# Patient Record
Sex: MALE | Race: WHITE | NOT HISPANIC OR LATINO | Employment: FULL TIME | URBAN - METROPOLITAN AREA
[De-identification: names, ages, dates, MRNs, and addresses within clinical notes are randomized per-mention and may not be internally consistent; named-entity substitution may affect disease eponyms.]

---

## 2017-01-06 ENCOUNTER — AMBULATORY - HEALTHEAST (OUTPATIENT)
Dept: CARDIOLOGY | Facility: CLINIC | Age: 57
End: 2017-01-06

## 2017-01-11 ENCOUNTER — OFFICE VISIT - HEALTHEAST (OUTPATIENT)
Dept: PULMONOLOGY | Facility: OTHER | Age: 57
End: 2017-01-11

## 2017-01-11 DIAGNOSIS — E66.2 OBESITY HYPOVENTILATION SYNDROME (H): ICD-10-CM

## 2017-01-11 DIAGNOSIS — G47.33 OSA (OBSTRUCTIVE SLEEP APNEA): ICD-10-CM

## 2017-01-11 DIAGNOSIS — R06.09 DYSPNEA ON EXERTION: ICD-10-CM

## 2017-01-13 ENCOUNTER — RECORDS - HEALTHEAST (OUTPATIENT)
Dept: ADMINISTRATIVE | Facility: OTHER | Age: 57
End: 2017-01-13

## 2017-01-26 ENCOUNTER — AMBULATORY - HEALTHEAST (OUTPATIENT)
Dept: PULMONOLOGY | Facility: OTHER | Age: 57
End: 2017-01-26

## 2017-02-01 ENCOUNTER — AMBULATORY - HEALTHEAST (OUTPATIENT)
Dept: PULMONOLOGY | Facility: OTHER | Age: 57
End: 2017-02-01

## 2017-03-13 ENCOUNTER — RECORDS - HEALTHEAST (OUTPATIENT)
Dept: ADMINISTRATIVE | Facility: OTHER | Age: 57
End: 2017-03-13

## 2017-03-26 ENCOUNTER — RECORDS - HEALTHEAST (OUTPATIENT)
Dept: ADMINISTRATIVE | Facility: OTHER | Age: 57
End: 2017-03-26

## 2017-04-12 ENCOUNTER — RECORDS - HEALTHEAST (OUTPATIENT)
Dept: ADMINISTRATIVE | Facility: OTHER | Age: 57
End: 2017-04-12

## 2017-04-16 ENCOUNTER — RECORDS - HEALTHEAST (OUTPATIENT)
Dept: ADMINISTRATIVE | Facility: OTHER | Age: 57
End: 2017-04-16

## 2017-04-17 ENCOUNTER — OFFICE VISIT - HEALTHEAST (OUTPATIENT)
Dept: PULMONOLOGY | Facility: OTHER | Age: 57
End: 2017-04-17

## 2017-04-17 DIAGNOSIS — G47.33 OSA (OBSTRUCTIVE SLEEP APNEA): ICD-10-CM

## 2017-04-17 DIAGNOSIS — E66.2 OBESITY HYPOVENTILATION SYNDROME (H): ICD-10-CM

## 2017-04-17 DIAGNOSIS — R06.09 DOE (DYSPNEA ON EXERTION): ICD-10-CM

## 2017-08-02 ENCOUNTER — RECORDS - HEALTHEAST (OUTPATIENT)
Dept: ADMINISTRATIVE | Facility: OTHER | Age: 57
End: 2017-08-02

## 2017-08-10 ENCOUNTER — OFFICE VISIT - HEALTHEAST (OUTPATIENT)
Dept: PULMONOLOGY | Facility: OTHER | Age: 57
End: 2017-08-10

## 2017-08-10 DIAGNOSIS — R06.09 DOE (DYSPNEA ON EXERTION): ICD-10-CM

## 2017-08-10 DIAGNOSIS — G47.33 OSA (OBSTRUCTIVE SLEEP APNEA): ICD-10-CM

## 2017-08-10 DIAGNOSIS — I50.32 CHRONIC DIASTOLIC CONGESTIVE HEART FAILURE (H): ICD-10-CM

## 2017-08-10 RX ORDER — POTASSIUM CHLORIDE 1500 MG/1
20 TABLET, EXTENDED RELEASE ORAL
Status: SHIPPED | COMMUNITY
Start: 2017-07-20

## 2018-02-19 ENCOUNTER — RECORDS - HEALTHEAST (OUTPATIENT)
Dept: ADMINISTRATIVE | Facility: OTHER | Age: 58
End: 2018-02-19

## 2018-02-27 ENCOUNTER — OFFICE VISIT - HEALTHEAST (OUTPATIENT)
Dept: PULMONOLOGY | Facility: OTHER | Age: 58
End: 2018-02-27

## 2018-02-27 DIAGNOSIS — R06.09 DYSPNEA ON EXERTION: ICD-10-CM

## 2018-02-27 DIAGNOSIS — G47.33 OBSTRUCTIVE SLEEP APNEA: ICD-10-CM

## 2018-02-27 DIAGNOSIS — E66.2 OBESITY HYPOVENTILATION SYNDROME (H): ICD-10-CM

## 2018-02-27 DIAGNOSIS — J96.11 CHRONIC RESPIRATORY FAILURE WITH HYPOXIA (H): ICD-10-CM

## 2018-02-28 ENCOUNTER — AMBULATORY - HEALTHEAST (OUTPATIENT)
Dept: PULMONOLOGY | Facility: OTHER | Age: 58
End: 2018-02-28

## 2018-03-12 ENCOUNTER — RECORDS - HEALTHEAST (OUTPATIENT)
Dept: ADMINISTRATIVE | Facility: OTHER | Age: 58
End: 2018-03-12

## 2018-03-13 ENCOUNTER — OFFICE VISIT - HEALTHEAST (OUTPATIENT)
Dept: PULMONOLOGY | Facility: OTHER | Age: 58
End: 2018-03-13

## 2018-03-13 DIAGNOSIS — E66.2 OBESITY HYPOVENTILATION SYNDROME (H): ICD-10-CM

## 2018-03-13 DIAGNOSIS — G47.33 OSA (OBSTRUCTIVE SLEEP APNEA): ICD-10-CM

## 2018-03-13 DIAGNOSIS — I27.20 PULMONARY HYPERTENSION (H): ICD-10-CM

## 2018-03-16 ENCOUNTER — AMBULATORY - HEALTHEAST (OUTPATIENT)
Dept: PULMONOLOGY | Facility: OTHER | Age: 58
End: 2018-03-16

## 2018-03-16 DIAGNOSIS — G47.33 OSA (OBSTRUCTIVE SLEEP APNEA): ICD-10-CM

## 2018-03-27 ENCOUNTER — RECORDS - HEALTHEAST (OUTPATIENT)
Dept: ADMINISTRATIVE | Facility: OTHER | Age: 58
End: 2018-03-27

## 2018-04-06 ENCOUNTER — RECORDS - HEALTHEAST (OUTPATIENT)
Dept: ADMINISTRATIVE | Facility: OTHER | Age: 58
End: 2018-04-06

## 2018-06-11 ENCOUNTER — RECORDS - HEALTHEAST (OUTPATIENT)
Dept: ADMINISTRATIVE | Facility: OTHER | Age: 58
End: 2018-06-11

## 2018-06-18 ENCOUNTER — RECORDS - HEALTHEAST (OUTPATIENT)
Dept: ADMINISTRATIVE | Facility: OTHER | Age: 58
End: 2018-06-18

## 2019-02-25 ENCOUNTER — RECORDS - HEALTHEAST (OUTPATIENT)
Dept: ADMINISTRATIVE | Facility: OTHER | Age: 59
End: 2019-02-25

## 2019-05-14 ENCOUNTER — COMMUNICATION - HEALTHEAST (OUTPATIENT)
Dept: PULMONOLOGY | Facility: OTHER | Age: 59
End: 2019-05-14

## 2019-06-10 ENCOUNTER — AMBULATORY - HEALTHEAST (OUTPATIENT)
Dept: PULMONOLOGY | Facility: OTHER | Age: 59
End: 2019-06-10

## 2019-06-10 ENCOUNTER — OFFICE VISIT - HEALTHEAST (OUTPATIENT)
Dept: PULMONOLOGY | Facility: OTHER | Age: 59
End: 2019-06-10

## 2019-06-10 ENCOUNTER — COMMUNICATION - HEALTHEAST (OUTPATIENT)
Dept: INTENSIVE CARE | Facility: CLINIC | Age: 59
End: 2019-06-10

## 2019-06-10 DIAGNOSIS — R06.02 SHORTNESS OF BREATH: ICD-10-CM

## 2019-06-10 DIAGNOSIS — I27.20 PULMONARY HYPERTENSION (H): ICD-10-CM

## 2019-06-10 DIAGNOSIS — G47.33 OSA (OBSTRUCTIVE SLEEP APNEA): ICD-10-CM

## 2019-06-10 DIAGNOSIS — R09.02 HYPOXEMIA: ICD-10-CM

## 2019-06-10 LAB
ALBUMIN SERPL-MCNC: 3.7 G/DL (ref 3.5–5)
ALP SERPL-CCNC: 67 U/L (ref 45–120)
ALT SERPL W P-5'-P-CCNC: 34 U/L (ref 0–45)
ANION GAP SERPL CALCULATED.3IONS-SCNC: 7 MMOL/L (ref 5–18)
AST SERPL W P-5'-P-CCNC: 25 U/L (ref 0–40)
BASOPHILS # BLD AUTO: 0.1 THOU/UL (ref 0–0.2)
BASOPHILS NFR BLD AUTO: 1 % (ref 0–2)
BILIRUB SERPL-MCNC: 1.2 MG/DL (ref 0–1)
BNP SERPL-MCNC: 117 PG/ML (ref 0–48)
BUN SERPL-MCNC: 18 MG/DL (ref 8–22)
CALCIUM SERPL-MCNC: 9.4 MG/DL (ref 8.5–10.5)
CHLORIDE BLD-SCNC: 107 MMOL/L (ref 98–107)
CO2 SERPL-SCNC: 28 MMOL/L (ref 22–31)
CREAT SERPL-MCNC: 0.91 MG/DL (ref 0.7–1.3)
EOSINOPHIL # BLD AUTO: 0.1 THOU/UL (ref 0–0.4)
EOSINOPHIL NFR BLD AUTO: 1 % (ref 0–6)
ERYTHROCYTE [DISTWIDTH] IN BLOOD BY AUTOMATED COUNT: 16.8 % (ref 11–14.5)
GFR SERPL CREATININE-BSD FRML MDRD: >60 ML/MIN/1.73M2
GLUCOSE BLD-MCNC: 57 MG/DL (ref 70–125)
HCT VFR BLD AUTO: 41 % (ref 40–54)
HGB BLD-MCNC: 12.7 G/DL (ref 14–18)
HIV 1+2 AB+HIV1 P24 AG SERPL QL IA: NEGATIVE
HIV HS MUST CALL RESULT (HISTORICAL CONVERSION): NORMAL
LYMPHOCYTES # BLD AUTO: 0.8 THOU/UL (ref 0.8–4.4)
LYMPHOCYTES NFR BLD AUTO: 9 % (ref 20–40)
MCH RBC QN AUTO: 27.4 PG (ref 27–34)
MCHC RBC AUTO-ENTMCNC: 31 G/DL (ref 32–36)
MCV RBC AUTO: 88 FL (ref 80–100)
MONOCYTES # BLD AUTO: 0.8 THOU/UL (ref 0–0.9)
MONOCYTES NFR BLD AUTO: 9 % (ref 2–10)
NEUTROPHILS # BLD AUTO: 7 THOU/UL (ref 2–7.7)
NEUTROPHILS NFR BLD AUTO: 80 % (ref 50–70)
PLATELET # BLD AUTO: 255 THOU/UL (ref 140–440)
PMV BLD AUTO: 11.2 FL (ref 8.5–12.5)
POTASSIUM BLD-SCNC: 4.8 MMOL/L (ref 3.5–5)
PROT SERPL-MCNC: 6.1 G/DL (ref 6–8)
RBC # BLD AUTO: 4.64 MILL/UL (ref 4.4–6.2)
SODIUM SERPL-SCNC: 142 MMOL/L (ref 136–145)
WBC: 8.8 THOU/UL (ref 4–11)

## 2019-06-10 RX ORDER — GLIPIZIDE 10 MG/1
10 TABLET, FILM COATED, EXTENDED RELEASE ORAL
Status: SHIPPED | COMMUNITY
Start: 2018-12-10

## 2019-06-11 LAB — SCL-70 AUTOANTIBODIES - HISTORICAL: 0 EU

## 2019-06-12 ENCOUNTER — HOSPITAL ENCOUNTER (OUTPATIENT)
Dept: CT IMAGING | Facility: HOSPITAL | Age: 59
Discharge: HOME | End: 2019-06-12
Attending: INTERNAL MEDICINE

## 2019-06-12 ENCOUNTER — HOSPITAL ENCOUNTER (OUTPATIENT)
Dept: RADIOLOGY | Facility: HOSPITAL | Age: 59
Discharge: HOME | End: 2019-06-12
Attending: INTERNAL MEDICINE

## 2019-06-12 DIAGNOSIS — R06.02 SHORTNESS OF BREATH: ICD-10-CM

## 2019-06-12 DIAGNOSIS — R09.02 HYPOXEMIA: ICD-10-CM

## 2019-06-12 DIAGNOSIS — I27.20 PULMONARY HYPERTENSION (H): ICD-10-CM

## 2019-06-14 ENCOUNTER — COMMUNICATION - HEALTHEAST (OUTPATIENT)
Dept: PULMONOLOGY | Facility: OTHER | Age: 59
End: 2019-06-14

## 2019-06-14 DIAGNOSIS — R09.02 HYPOXIA: ICD-10-CM

## 2019-06-14 DIAGNOSIS — I27.20 PULMONARY HYPERTENSION (H): ICD-10-CM

## 2019-06-19 ENCOUNTER — AMBULATORY - HEALTHEAST (OUTPATIENT)
Dept: SLEEP MEDICINE | Facility: CLINIC | Age: 59
End: 2019-06-19

## 2019-06-24 ENCOUNTER — HOSPITAL ENCOUNTER (OUTPATIENT)
Dept: NUCLEAR MEDICINE | Facility: HOSPITAL | Age: 59
Discharge: HOME | End: 2019-06-24
Attending: INTERNAL MEDICINE

## 2019-06-24 ENCOUNTER — HOSPITAL ENCOUNTER (OUTPATIENT)
Dept: RADIOLOGY | Facility: HOSPITAL | Age: 59
Discharge: HOME | End: 2019-06-24
Attending: INTERNAL MEDICINE

## 2019-06-24 DIAGNOSIS — I27.20 PULMONARY HYPERTENSION (H): ICD-10-CM

## 2019-06-24 DIAGNOSIS — R09.02 HYPOXIA: ICD-10-CM

## 2019-06-24 DIAGNOSIS — Z92.89 HISTORY OF V/Q SCAN: ICD-10-CM

## 2019-07-02 ENCOUNTER — RECORDS - HEALTHEAST (OUTPATIENT)
Dept: PULMONOLOGY | Facility: OTHER | Age: 59
End: 2019-07-02

## 2019-07-02 ENCOUNTER — RECORDS - HEALTHEAST (OUTPATIENT)
Dept: ADMINISTRATIVE | Facility: OTHER | Age: 59
End: 2019-07-02

## 2019-07-02 DIAGNOSIS — R06.02 SHORTNESS OF BREATH: ICD-10-CM

## 2019-07-02 DIAGNOSIS — R09.02 HYPOXEMIA: ICD-10-CM

## 2019-07-02 DIAGNOSIS — I27.20 PULMONARY HYPERTENSION, UNSPECIFIED (H): ICD-10-CM

## 2019-07-03 ENCOUNTER — OFFICE VISIT - HEALTHEAST (OUTPATIENT)
Dept: PULMONOLOGY | Facility: OTHER | Age: 59
End: 2019-07-03

## 2019-07-03 DIAGNOSIS — R06.09 DOE (DYSPNEA ON EXERTION): ICD-10-CM

## 2019-07-03 DIAGNOSIS — R09.02 HYPOXEMIA: ICD-10-CM

## 2019-07-03 DIAGNOSIS — G47.33 OSA (OBSTRUCTIVE SLEEP APNEA): ICD-10-CM

## 2019-07-03 DIAGNOSIS — E66.2 OBESITY HYPOVENTILATION SYNDROME (H): ICD-10-CM

## 2019-08-14 ENCOUNTER — AMBULATORY - HEALTHEAST (OUTPATIENT)
Dept: PULMONOLOGY | Facility: OTHER | Age: 59
End: 2019-08-14

## 2019-08-14 ENCOUNTER — OFFICE VISIT - HEALTHEAST (OUTPATIENT)
Dept: PULMONOLOGY | Facility: OTHER | Age: 59
End: 2019-08-14

## 2019-08-14 DIAGNOSIS — J96.11 CHRONIC RESPIRATORY FAILURE WITH HYPOXIA (H): ICD-10-CM

## 2019-08-14 DIAGNOSIS — G47.33 OSA (OBSTRUCTIVE SLEEP APNEA): ICD-10-CM

## 2019-08-14 DIAGNOSIS — I27.20 PULMONARY HYPERTENSION (H): ICD-10-CM

## 2019-09-09 ENCOUNTER — RECORDS - HEALTHEAST (OUTPATIENT)
Dept: ADMINISTRATIVE | Facility: OTHER | Age: 59
End: 2019-09-09

## 2019-09-09 ENCOUNTER — AMBULATORY - HEALTHEAST (OUTPATIENT)
Dept: PULMONOLOGY | Facility: OTHER | Age: 59
End: 2019-09-09

## 2019-09-26 ENCOUNTER — RECORDS - HEALTHEAST (OUTPATIENT)
Dept: ADMINISTRATIVE | Facility: OTHER | Age: 59
End: 2019-09-26

## 2020-01-10 ENCOUNTER — RECORDS - HEALTHEAST (OUTPATIENT)
Dept: ADMINISTRATIVE | Facility: OTHER | Age: 60
End: 2020-01-10

## 2020-03-09 ENCOUNTER — COMMUNICATION - HEALTHEAST (OUTPATIENT)
Dept: SCHEDULING | Facility: CLINIC | Age: 60
End: 2020-03-09

## 2020-05-18 ENCOUNTER — AMBULATORY - HEALTHEAST (OUTPATIENT)
Dept: SURGERY | Facility: CLINIC | Age: 60
End: 2020-05-18

## 2020-05-18 DIAGNOSIS — Z11.59 ENCOUNTER FOR SCREENING FOR OTHER VIRAL DISEASES: ICD-10-CM

## 2020-06-15 ENCOUNTER — AMBULATORY - HEALTHEAST (OUTPATIENT)
Dept: FAMILY MEDICINE | Facility: CLINIC | Age: 60
End: 2020-06-15

## 2020-06-15 DIAGNOSIS — Z11.59 ENCOUNTER FOR SCREENING FOR OTHER VIRAL DISEASES: ICD-10-CM

## 2020-06-18 ENCOUNTER — ANESTHESIA - HEALTHEAST (OUTPATIENT)
Dept: SURGERY | Facility: CLINIC | Age: 60
End: 2020-06-18

## 2020-06-18 ENCOUNTER — SURGERY - HEALTHEAST (OUTPATIENT)
Dept: SURGERY | Facility: CLINIC | Age: 60
End: 2020-06-18

## 2020-06-18 ASSESSMENT — MIFFLIN-ST. JEOR: SCORE: 2033.71

## 2021-05-28 NOTE — TELEPHONE ENCOUNTER
"Call from patient. Has seen Dr. Ayala in the past (over 1 year ago).  States he has been having more shortness of breath with lower oxygen saturations recently.  Was just at his cardiologist and they told him his breathing issues are not caused by his heart.    Patient states his oxygen levels are as low as 78-80% when he is up walking around. Patient has oxygen at home but states he needs to send it back because he cannot afford it, insurance will not pay.  When he sits and rests his oxygen sats are up to 90%.  sats recover quickly with rest.    Suggested that patient needs to use his oxygen as much as possible to keep his sats greater than 88%.  States all he has is tanks and he cannot \"cart them around\".     Explained to patient that he has not been seen for over a year so an office visit will be needed to document need for oxygen.  As an alternative, he could see his PCP for assistance until he can be seen by pulm.  Was able to scheduled for [address] and will place patient on the cancellation list for earlier appointment time. If needing assistance immediately, he can be seen in urgent care or Ed.  "

## 2021-05-29 NOTE — PROGRESS NOTES
DME Provider: Danial  Date Faxed: 6/10/19  Ordering Provider: Dr. Vieira  Equipment ordered: oxygen

## 2021-05-29 NOTE — PROGRESS NOTES
Oxygen saturation walk test    Patient oxygen saturation on RA at rest is 80%.    Oxygen pulse dose testing  While ambulating 150ft on 4LPM at pulse dose, oxygen saturation is 81%.     Oxygen continuous dose testing  While ambulating 150ft on 2LPM continuous dose, oxygen saturation is 82%.  While ambulating 150ft on 3LPM continuous dose, oxygen saturation is 87%.  While ambulating 150ft on 4LPM continuous dose, oxygen saturation is 89%.    Pt states that he doesn't want tanks, he wants a POC.      DME Provider: Danial  (he currently has a concentrator that he uses 2L at night with BiPap)      Patient is ambulatory within his/her home.

## 2021-05-29 NOTE — PROGRESS NOTES
Sleep Direct Referral Source: Pulmonary  Order for sleep study received, reviewed.   History (per ordering provider's note and chart review):   Pulmonary hypertension thought to be WHO 3 due to OHS and STUART, last seen by Dr. Ayala in our clinic in March 2018.  At that time his symptoms were stable and his O2 saturations were normal on room air.  He has been using 2 L/min oxygen bleed and at night with his home BiPAP machine which he states he uses every night since obtaining it 3 years ago.  He presents to clinic today for worsening shortness of breath and hypoxemia noted to be in the low 80s at home for the past 4 to 6 weeks.  He denies fevers or chills, cough, recent sick contacts, or other history concerning for acute infection.  He denies any recent weight gain and she is actually lost about 20 pounds since obtaining his BiPAP machine 3 years ago.  He has followed by a cardiologist at [facility] who have determined his cardiac issues are not the cause of his shortness of breath or hypoxemia, however they are concerned for worsening of his pulmonary hypertension.  Patient continues to smoke about 2 cigars/week and is exposed to feel exhausted in his job as a .    Comments for study:   Please schedule consult first  Jose Estrella MD  6:42 PM, 6/19/2019

## 2021-05-29 NOTE — TELEPHONE ENCOUNTER
"Called by lab over 8 hours after low glucose drawn from pulmonary clinic. Glucose of 52. Called patient-he is awake and alert and feeling ok. He was confused why I was concerned about hypoglycemia, \"isn't the goal to have low glucose?\"     I explained that too low can be dangerous too. He has glucometer and will check his sugars now. Goal is >80. I asked that he call his PCP prior to taking glipizide and discuss what needs to be adjusted.    Dr. Vieira updated.    Philly Moore MD  Electronically signed on 6/10/2019 10:31 PM      "

## 2021-05-29 NOTE — PATIENT INSTRUCTIONS - HE
New sleep study and sleep clinic follow-up    CXR today    Chest CT angiogram to look for blood clots    Pulmonary Function Tests    Blood tests today     Use home oxygen during daytime to maintain sats>92%    Use mask when around gas fumes    Avoid/refrain from all types of smoke (including cigars)    Return to pulmonary clinic in 2 weeks

## 2021-05-29 NOTE — PROGRESS NOTES
Assessment:Kenrick Garner is a 59 y.o. with a past medical history significant for pulmonary hypertension thought to be WHO 3 due to OHS and STUART, last seen by Dr. Ayala in our clinic in March 2018.  At that time his symptoms were stable and his O2 saturations were normal on room air.  He has been using 2 L/min oxygen bleed and at night with his home BiPAP machine which he states he uses every night since obtaining it 3 years ago.  He presents to clinic today for worsening shortness of breath and hypoxemia noted to be in the low 80s at home for the past 4 to 6 weeks.  He denies fevers or chills, cough, recent sick contacts, or other history concerning for acute infection.  He denies any recent weight gain and she is actually lost about 20 pounds since obtaining his BiPAP machine 3 years ago.  He has followed by a cardiologist at [facility] who have determined his cardiac issues are not the cause of his shortness of breath or hypoxemia, however they are concerned for worsening of his pulmonary hypertension.  Patient continues to smoke about 2 cigars/week and is exposed to feel exhausted in his job as a .    At clinic visit today, the patient was noted to be hypoxic in the low 80s on room air.  He required 1 L/min of oxygen at rest, and 4 L/min with ambulation to maintain O2 sats in the normal range.    4/5/2019 echocardiogram at [facility]: Normal LVEF, no change noted from 7/2017 echocardiogram, right ventricular systolic function cannot be adequately assessed    Worsening in his shortness of breath and new hypoxemia during the daytime could be due to worsening of his pulmonary hypertension or another etiology such as PE, interstitial lung disease, or other pathology.    Plan:  New sleep study and sleep clinic follow-up, the patient states that it has been 3 years since his BiPAP has been checked    CXR ordered for and patient instructed to obtain it today    If chest CT negative, obtain chest CT  angiogram to look for pulmonary emboli and/or fibrosis    Pulmonary Function Tests    Blood tests today to check renal function and for additional causes of pulmonary hypertension    Use home oxygen during daytime to maintain sats>92%: 1 L/min at rest and 4 L/min with exertion.  I emphasized to the patient and his wife to use home oxygen at all times, particularly when driving as hypoxemia could put him at risk of becoming unconscious placing him at risk to himself and others.  Both he and his wife verbalized their understanding.  Orders for portable oxygen to use at rest and with exertion during the daytime have been sent to his DME provider at [facility].    Patient advised to use mask when around gas fumes    Avoid/refrain from all types of smoke (including cigars)    Return to pulmonary clinic in 2 weeks, will attempt to schedule with 1 of our pulmonary hypertension focused providers    CCx: Shortness of breath    HPI: The patient states that he has been having severe shortness of breath for little over a month.  Shortness of breath primarily occurs with exertion, but occasionally at rest.  He has not noted any modifying factors.  He has been seen by cardiologist with a negative work-up.  He denies any recent cough, fevers or chills, nausea vomiting, or other infectious symptoms.  He continues to use his BiPAP every night with 2 L of oxygen bleed done, however does not use oxygen during the daytime.  He has noted his home O2 sats to be in the low 80s on multiple occasions at home.  He continues to smoke a couple of cigars per week.  He states that he is also exposed to frequent gas fumes at his job as a  which he cannot avoid, however he believes he could get a mask to use around the fumes.    ROS:  A review of 12 organ systems was performed with pertinent positives and negatives noted in the HPI.      Current Meds:  Current Outpatient Medications   Medication Sig Note     acetaminophen (TYLENOL) 500  MG tablet Take 1,000 mg by mouth every 6 (six) hours as needed for pain.      amLODIPine (NORVASC) 10 MG tablet Take 10 mg by mouth daily.      aspirin 81 MG EC tablet Take 81 mg by mouth daily.      atorvastatin (LIPITOR) 40 MG tablet Take 40 mg by mouth every evening.       carvedilol (COREG) 12.5 MG tablet Take 12.5 mg by mouth 2 (two) times a day with meals.      furosemide (LASIX) 40 MG tablet Take 40 mg by mouth. 8/10/2017: Received from: [employer] Received Sig: Take 1 tablet by mouth every morning.     glipiZIDE (GLUCOTROL XL) 10 MG 24 hr tablet Take 10 mg by mouth 2 (two) times a day.      lisinopril (PRINIVIL,ZESTRIL) 40 MG tablet Take 40 mg by mouth daily.      metFORMIN (GLUCOPHAGE) 500 MG tablet Take 1,000 mg by mouth 2 (two) times a day with meals.      multivitamin with minerals (THERA-M) 9 mg iron-400 mcg Tab tablet Take 1 tablet by mouth daily.      nitroglycerin (NITROSTAT) 0.4 MG SL tablet Place 0.4 mg under the tongue every 5 (five) minutes as needed for chest pain.      omeprazole (PRILOSEC) 20 MG capsule Take 20 mg by mouth daily.      OXYGEN-AIR DELIVERY SYSTEMS [facility] Use As Directed. 2L with BiPap at night  [facility]      potassium chloride SA (K-DUR,KLOR-CON) 20 MEQ tablet Take 20 mEq by mouth. 8/10/2017: Received from: [employer] Received Sig: Take 1 tablet by mouth once daily with a meal.       Labs:  No results found for this or any previous visit (from the past 72 hour(s)).    I have personally reviewed all pertinent imaging studies and PFT results unless otherwise noted.    Imaging studies:  No results found.      Physical Exam:  /66   Pulse 64   Resp 20   Wt (!) 250 lb 14.4 oz (113.8 kg)   SpO2 94% Comment: 3 LPM  BMI 36.26 kg/m    General - Well nourished  Ears/Mouth -  OP pink moist, no thrush  Neck - no cervical lymphadenopathy  Lungs - Clear to ausculation bilaterally   CVS - regular rhythm with no murmurs,  rubs or gallups  Abdomen - soft, NT, ND, NABS  Ext - no cyanosis, clubbing or edema  Skin - no rash  Psychology - alert and oriented, answers appropriate        Electronically signed by:    Puma Vieira MD  [facility] Pulmonary and Critical Care Medicine

## 2021-05-29 NOTE — TELEPHONE ENCOUNTER
I returned Mr Garner's call regarding the results of his chest CTA earlier this week, informing him that no acute blood clot was found. There was evidence of pulmonary edema/interstitial fluid, which could be from pulmonary HTN or HF. His BNP was also elevated, and his HIV and Scl were negative. His recent TTE at [place] in April did not show overt evidence of HF, however. I will defer to Dr Jacobs whom he sees in a few weeks whther to pursue a right heart catheterization. In the meantime, I will order a V/Q scan to eval for more chronic thromboembolic disease. The patient was in agreement with this plan.    I also advised him again to use home oxygen with exertion and especially while driving for now, and he verbalized his understanding.

## 2021-05-30 VITALS — BODY MASS INDEX: 37.57 KG/M2 | WEIGHT: 260 LBS

## 2021-05-30 VITALS — WEIGHT: 268.2 LBS | BODY MASS INDEX: 38.76 KG/M2

## 2021-05-30 NOTE — PROGRESS NOTES
Assessment:Kenrick Garner is a 59 y.o. with a past medical history significant for pulmonary hypertension thought to be WHO 3 due to OHS and STUART, last seen by Dr. Ayala in our clinic in March 2018.  At the last clinic visit and he was noted to be desaturating at rest on room air and was advised to wear supplemental oxygen throughout the day.  He also underwent an evaluation to look for other etiologies for his hypoxemia and is here to follow-up with this.  Notably his chest CTA demonstrated very minimal fibro-basilar atelectasis with small airways disease and his VQ scan was unremarkable.  I suspect that his daytime hypoxia is also related to his obesity hypoventilation syndrome. For the present we would recommend;    Going compliance bilevel ventilation as he is presently.    Continue to weight loss.    Explained to the patient that he should wear his supplemental oxygen 24 hours a day.    Follow-up in 3 months.    Erma Pottsvi  Pulmonary and Critical Care  [id number]      CCx: Shortness of breath    HPI: The patient states that he has been having severe shortness of breath for little over a month.  Shortness of breath primarily occurs with exertion, but occasionally at rest.  He has not noted any modifying factors.  He has been seen by cardiologist with a negative work-up.  He denies any recent cough, fevers or chills, nausea vomiting, or other infectious symptoms.  He continues to use his BiPAP every night with 2 L of oxygen bleed done, however does not use oxygen during the daytime.  He has noted his home O2 sats to be in the low 80s on multiple occasions at home.  He continues to smoke a couple of cigars per week.  He states that he is also exposed to frequent gas fumes at his job as a  which he cannot avoid, however he believes he could get a mask to use around the fumes.    ROS:  A review of 12 organ systems was performed with pertinent positives and negatives noted in the HPI.      Current  Meds:  Current Outpatient Medications   Medication Sig Note     acetaminophen (TYLENOL) 500 MG tablet Take 1,000 mg by mouth every 6 (six) hours as needed for pain.      amLODIPine (NORVASC) 10 MG tablet Take 10 mg by mouth daily.      aspirin 81 MG EC tablet Take 81 mg by mouth daily.      atorvastatin (LIPITOR) 40 MG tablet Take 40 mg by mouth every evening.       carvedilol (COREG) 12.5 MG tablet Take 12.5 mg by mouth 2 (two) times a day with meals.      furosemide (LASIX) 40 MG tablet Take 40 mg by mouth. 8/10/2017: Received from: [employer] [facility] [employer] [facility] Received Sig: Take 1 tablet by mouth every morning.     glipiZIDE (GLUCOTROL XL) 10 MG 24 hr tablet Take 10 mg by mouth 2 (two) times a day.      lisinopril (PRINIVIL,ZESTRIL) 40 MG tablet Take 40 mg by mouth daily.      metFORMIN (GLUCOPHAGE) 500 MG tablet Take 1,000 mg by mouth 2 (two) times a day with meals.      multivitamin with minerals (THERA-M) 9 mg iron-400 mcg Tab tablet Take 1 tablet by mouth daily.      nitroglycerin (NITROSTAT) 0.4 MG SL tablet Place 0.4 mg under the tongue every 5 (five) minutes as needed for chest pain.      omeprazole (PRILOSEC) 20 MG capsule Take 20 mg by mouth daily.      OXYGEN-AIR DELIVERY SYSTEMS [facility] Use As Directed. 2L with BiPap at night  [facility]      potassium chloride SA (K-DUR,KLOR-CON) 20 MEQ tablet Take 20 mEq by mouth. 8/10/2017: Received from: [employer] Received Sig: Take 1 tablet by mouth once daily with a meal.       Labs:  No results found for this or any previous visit (from the past 72 hour(s)).    I have personally reviewed all pertinent imaging studies and PFT results unless otherwise noted.    Imaging studies:  No results found.      Physical Exam:  /70   Pulse 60   Resp 24   Wt (!) 249 lb 11.2 oz (113.3 kg)   SpO2 (!) 75% Comment: RA resting  BMI 36.09 kg/m    Physical Exam   Constitutional: He is oriented to person, place, and time. He  appears well-developed and well-nourished. No distress.   HENT:   Head: Normocephalic and atraumatic.   Eyes: Pupils are equal, round, and reactive to light.   Neck: Normal range of motion.   Cardiovascular: Normal rate and regular rhythm.   Pulmonary/Chest: Effort normal. No respiratory distress. He has no wheezes.   Abdominal: Soft.   Musculoskeletal: Normal range of motion.   Neurological: He is alert and oriented to person, place, and time.   Skin: Skin is warm. He is not diaphoretic.

## 2021-05-30 NOTE — PATIENT INSTRUCTIONS - HE
Terms related to your Pulmonary Function testin. Spirometry: Measures the mechanical function of your lungs; can you get enough air into your lungs; if yes, can you get an appropriate amount out of your lungs?. If not, how much air remains stuck inside your lungs?  1. FEV1: Forced Expiratory Volume in 1 second (measured in liters). 70% or more is normal.  2. FVC: Forced Vital Capacity (measured in liters). 70% or more is normal.  3. FEV1/FVC: Ratio of air forced out in the first second of forcing out air to the total amount of air forced out. This tells us if air is getting stuck in your lungs or not. 70% or more is normal.  2. Lung volumes:Measures the size of your lungs during quiet breathing.   1. TLC: Total lung capacity (measured in liters). Normal range is between %.  2. RV: Residual volume (measured in liters); is the measure of how much air is left over in your lungs after quiet exhalation. No one exhales down to zero volume. Normal range is between %.  3. Diffusion capacity: Measures your lungs ability to exchange oxygen across the lung tissue.  1. DLCOcor:  The normal range is greater than 65%.

## 2021-05-31 VITALS — WEIGHT: 263 LBS | BODY MASS INDEX: 38.01 KG/M2

## 2021-05-31 NOTE — PROGRESS NOTES
Called and spoke with nurse for Dr. Osuna ( [facility]).  Evie will get message to dr. Osuna that Dr. Ayala feels patient is in need of Right Heart Cath.  Asking for this to be done within the next 7-10 days.      Office visit notes will be faxed when completed to [phone].

## 2021-05-31 NOTE — PROGRESS NOTES
Assessment/Plan:        Diagnoses and all orders for this visit:    STUART (obstructive sleep apnea)    Pulmonary hypertension (H)    Chronic respiratory failure with hypoxia (H)    58M with OHS, STUART, pulmonary hypertension here for follow up.  He is doing very poorly.    His hypoxia is worse.  It is out of proportion to his sleep apnea and any contribution from obesity hypoventilation syndrome.    There is no evident airways disease, his parenchyma is normal on PE protocol CT.  V/Q scan is without chronic PE.    Our earlier concern was that he had pulmonary hypertension WHO III but his level of hypoxia seems out of proportion to that.    I am concerned that his pulmonary hypertension is worse and he may need advanced therapies.    I will check his sleep report -  I think it's probably reasonably treated.    I was clear with him that the most important initial treatment is keeping his oxygen saturation 90% or above.          Subjective:    Patient ID: Kenrick Garner is a 59 y.o. male.  59M with known STUART, OHS and previous RHC 18 months ago with Mild pulmonary hypertension, here, doing poorly.    His resting oxygen saturation is usually in the low 80s or mid 70s.  He doesn't measure his walking sats but he gets deep dull chest pain with walking that resolves with rest.  This is not typical of anginal type pain that he has had before.  No sputum, hemoptysis, respiratory symptoms.    He has had an extensive workup for respiratory issues.  He had a RHC 18 months ago that showed moderate pHTN    He does not have much LE edema - very minimal.  No abdominal swelling.    Trouble sleeping supine due to snoring.    Sleep stuff through [facility].      Review of Systems   Respiratory: Positive for shortness of breath.        Objective:    Physical Exam   Constitutional: He is oriented to person, place, and time. He appears well-developed and well-nourished. No distress.   HENT:   Head: Normocephalic.   Nose: Nose normal.   Eyes:  Pupils are equal, round, and reactive to light. Right eye exhibits no discharge. Left eye exhibits no discharge. No scleral icterus.   Cardiovascular: Normal rate and regular rhythm. Exam reveals no gallop and no friction rub.   No murmur heard.  Pulmonary/Chest: Effort normal and breath sounds normal. No respiratory distress. He has no wheezes. He has no rales.   Musculoskeletal: He exhibits no edema or tenderness.   Neurological: He is alert and oriented to person, place, and time. No cranial nerve deficit.   Skin: Skin is warm and dry. No rash noted. He is not diaphoretic. No erythema. No pallor.   Psychiatric: He has a normal mood and affect. His behavior is normal. Judgment and thought content normal.           Current Outpatient Medications on File Prior to Visit   Medication Sig Dispense Refill     acetaminophen (TYLENOL) 500 MG tablet Take 1,000 mg by mouth every 6 (six) hours as needed for pain.       amLODIPine (NORVASC) 10 MG tablet Take 10 mg by mouth daily.       aspirin 81 MG EC tablet Take 81 mg by mouth daily.       atorvastatin (LIPITOR) 40 MG tablet Take 40 mg by mouth every evening.        carvedilol (COREG) 12.5 MG tablet Take 12.5 mg by mouth 2 (two) times a day with meals.       furosemide (LASIX) 40 MG tablet Take 40 mg by mouth.       glipiZIDE (GLUCOTROL XL) 10 MG 24 hr tablet Take 10 mg by mouth 2 (two) times a day.       lisinopril (PRINIVIL,ZESTRIL) 40 MG tablet Take 40 mg by mouth daily.       metFORMIN (GLUCOPHAGE) 500 MG tablet Take 1,000 mg by mouth 2 (two) times a day with meals.       multivitamin with minerals (THERA-M) 9 mg iron-400 mcg Tab tablet Take 1 tablet by mouth daily.       nitroglycerin (NITROSTAT) 0.4 MG SL tablet Place 0.4 mg under the tongue every 5 (five) minutes as needed for chest pain.       omeprazole (PRILOSEC) 20 MG capsule Take 20 mg by mouth daily.       OXYGEN-AIR DELIVERY SYSTEMS MISC Use As Directed. 2L with BiPap at night  [facility]       potassium chloride  SA (K-DUR,KLOR-CON) 20 MEQ tablet Take 20 mEq by mouth.       No current facility-administered medications on file prior to visit.      /70   Pulse 60   Resp 20   Wt (!) 247 lb (112 kg)   SpO2 92% Comment: 4LPM  BMI 35.70 kg/m      Medical History  Active Ambulatory (Non-Hospital) Problems    Diagnosis     Pulmonary hypertension (H)     Coronary artery disease involving native coronary artery of native heart with angina pectoris (H)     Essential hypertension     Dyslipidemia, goal LDL below 70     STUART (obstructive sleep apnea)     Obesity hypoventilation syndrome (H)     Small airways disease     SANCHES (dyspnea on exertion)     Sepsis, due to unspecified organism (H)     Type 2 diabetes mellitus with hyperglycemia (H)     Hematuria, gross     Hematuria     Past Medical History:   Diagnosis Date     Cardiomyopathy (H)      Carpal tunnel syndrome      CHF (congestive heart failure) (H)      Coronary artery disease      DM2 (diabetes mellitus, type 2) (H)      Dyspnea on exertion 12/15/2016     Family history of myocardial infarction      GERD (gastroesophageal reflux disease)      High cholesterol      HLD (hyperlipidemia)      HTN (hypertension)      MI (myocardial infarction) (H)      Sleep apnea, obstructive          Social History  Reviewed, and he is still abstaining from smoking.  Still driving truck.  Minimal exertion at work.   Allergies  Allergies   Allergen Reactions     Tetanus And Diphtheria Toxoids                               Data Review - imaging, labs, and ekgs listed below were reviewed by me.  Chest XRay and chest CT images and EKG tracings interpreted personally.     Past Labs  No visits with results within 2 Month(s) from this visit.   Latest known visit with results is:   Office Visit on 06/10/2019   Component Date Value     BNP 06/10/2019 117*     HIV Antigen / Antibody 06/10/2019 Negative      Must call result to S * 06/10/2019 Philly Moore MD      Sodium 06/10/2019 142       Potassium 06/10/2019 4.8      Chloride 06/10/2019 107      CO2 06/10/2019 28      Anion Gap, Calculation 06/10/2019 7      Glucose 06/10/2019 57*     BUN 06/10/2019 18      Creatinine 06/10/2019 0.91      GFR MDRD Af Amer 06/10/2019 >60      GFR MDRD Non Af Amer 06/10/2019 >60      Bilirubin, Total 06/10/2019 1.2*     Calcium 06/10/2019 9.4      Protein, Total 06/10/2019 6.1      Albumin 06/10/2019 3.7      Alkaline Phosphatase 06/10/2019 67      AST 06/10/2019 25      ALT 06/10/2019 34      Scl-70 Autoantibodies 06/10/2019 0      WBC 06/10/2019 8.8      RBC 06/10/2019 4.64      Hemoglobin 06/10/2019 12.7*     Hematocrit 06/10/2019 41.0      MCV 06/10/2019 88      MCH 06/10/2019 27.4      MCHC 06/10/2019 31.0*     RDW 06/10/2019 16.8*     Platelets 06/10/2019 255      MPV 06/10/2019 11.2      Neutrophils % 06/10/2019 80*     Lymphocytes % 06/10/2019 9*     Monocytes % 06/10/2019 9      Eosinophils % 06/10/2019 1      Basophils % 06/10/2019 1      Neutrophils Absolute 06/10/2019 7.0      Lymphocytes Absolute 06/10/2019 0.8      Monocytes Absolute 06/10/2019 0.8      Eosinophils Absolute 06/10/2019 0.1      Basophils Absolute 06/10/2019 0.1      On review of [facility] cath reports, cards note, echo reports.  Echo without clear RV dysfunction but poor window.  BNP is going up.    >40 minutes spent, >50% counseling and coordination of care.

## 2021-05-31 NOTE — PATIENT INSTRUCTIONS - HE
We are worried you have pulmonary hypertension. This is a problem where the blood pressure in your lungs is too high.    The test for this is a right heart catheterization.  I am going to ask Dr. Osuna to arrange.    There are many treatments available, but getting this information first is critical.    Keep your oxygen level above 90% as much as possible.    Keep wearing your BiPAP as you are.  You are doing a good job.

## 2021-06-01 VITALS — WEIGHT: 264 LBS | BODY MASS INDEX: 38.15 KG/M2

## 2021-06-01 NOTE — PROGRESS NOTES
Call from Evie at Dr. Osuna's office.  Faxed report of cardiac cath.  Dr. Osuna wanted to let dr. Ayala know that patient would like to follow at [facility] in their Pulmonary Hypertension clinic.  He is being set up to be seen there.  Dr. Osuna has spoken with the patient regarding his results.    Evie phone [phone]

## 2021-06-02 ENCOUNTER — RECORDS - HEALTHEAST (OUTPATIENT)
Dept: ADMINISTRATIVE | Facility: CLINIC | Age: 61
End: 2021-06-02

## 2021-06-03 VITALS — WEIGHT: 249.7 LBS | BODY MASS INDEX: 36.09 KG/M2

## 2021-06-03 VITALS — BODY MASS INDEX: 36.26 KG/M2 | WEIGHT: 250.9 LBS

## 2021-06-03 VITALS — BODY MASS INDEX: 35.7 KG/M2 | WEIGHT: 247 LBS

## 2021-06-04 VITALS — HEIGHT: 70 IN | WEIGHT: 267.3 LBS | BODY MASS INDEX: 38.27 KG/M2

## 2021-06-08 NOTE — PROGRESS NOTES
Called [facility] and asked on the status of the order.  Sabine with [facility] stated that they had contacted the patient and left a message for them to contact [facility], but have not heard back from the patient as of yet (01/26/17).

## 2021-06-08 NOTE — ANESTHESIA PREPROCEDURE EVALUATION
Anesthesia Evaluation      Patient summary reviewed   No history of anesthetic complications     Airway   Mallampati: II  Neck ROM: full   Pulmonary    (+) shortness of breath, sleep apnea,                          Cardiovascular   (+) hypertension, CAD, angina, CHF, ,      Neuro/Psych    (+) neuromuscular disease,      Endo/Other    (+) diabetes mellitus,      GI/Hepatic/Renal    (+) GERD,   chronic renal disease,           Dental                         Anesthesia Plan  Planned anesthetic: MAC    ASA 3     Anesthetic plan and risks discussed with: patient    Post-op plan: routine recovery

## 2021-06-08 NOTE — PROGRESS NOTES
Assessment/Plan:        Diagnoses and all orders for this visit:    STUART (obstructive sleep apnea)    Obesity hypoventilation syndrome    Dyspnea on exertion    Severe STUART - will discuss with Sleep MD.  Final PSG read is pending.  It looked like CPAP 10 with 2 lpm was reasonable with cessation of central apneas.    OHS - related- plan on treating STUART and following up.    Dyspnea - not directly related to STUART or hypoventilation, but weight, deconditioning, fluid retention can all worsen this.    I wrote for him to stay off work until 1/23 so we can start CPAP and give him some time on treatment.        Subjective:    Patient ID: Kenrick Garner is a 56 y.o. male.    HPI Comments: Here in follow up.    He did his sleep study and it was tough but he was able to sleep uninterrupted for 2+ hours at the end.  Diagnosed with severe STUART with central apneas.    He is feeling better from a breathing standpoint.  He is no longer wearing his oxygen.      --- progress  56M here for first visit after hospitalisation.  He has been having shortness of breath for several weeks. He was admitted with acute hypoxic respiratory failure recently. However he didn't have much by way of symptoms at that time.  He was diuresed.  He was sent home on oxygen.  His symptoms during the hospitalization weren't affected much.  His symptoms didn't localize.  + lower extremity edema.  No sputum production or hemoptysis.    He had an episode of pneumonia 2 years ago.  This doesn't feel similar to that at all.    30 years ago had a positive PSG for STUART and he did not tolerate CPAP.    He sometimes has sats during periods of inactivity but when up and moving or talking his sats are already normal.  He has no history of lung disease.          Review of Systems             Objective:    Physical Exam   Constitutional: He appears well-developed and well-nourished. No distress.   Skin: He is not diaphoretic.   Psychiatric: He has a normal mood and affect. His  behavior is normal.           Current Outpatient Prescriptions on File Prior to Visit   Medication Sig Dispense Refill     acetaminophen (TYLENOL) 500 MG tablet Take 1,000 mg by mouth every 6 (six) hours as needed for pain.       amLODIPine (NORVASC) 10 MG tablet Take 10 mg by mouth daily.       aspirin 81 MG EC tablet Take 81 mg by mouth daily.       atorvastatin (LIPITOR) 40 MG tablet Take 40 mg by mouth every evening.        carvedilol (COREG) 12.5 MG tablet Take 12.5 mg by mouth 2 (two) times a day with meals.       furosemide (LASIX) 40 MG tablet Take 1 tablet (40 mg total) by mouth daily. 30 tablet 0     lisinopril (PRINIVIL,ZESTRIL) 40 MG tablet Take 40 mg by mouth daily.       metFORMIN (GLUCOPHAGE) 500 MG tablet Take 1,000 mg by mouth 2 (two) times a day with meals.       multivitamin with minerals (THERA-M) 9 mg iron-400 mcg Tab tablet Take 1 tablet by mouth daily.       nitroglycerin (NITROSTAT) 0.4 MG SL tablet Place 0.4 mg under the tongue every 5 (five) minutes as needed for chest pain.       omeprazole (PRILOSEC) 20 MG capsule Take 20 mg by mouth daily.       sitaGLIPtin (JANUVIA) 100 MG tablet Take 1 tablet (100 mg total) by mouth daily. 30 tablet 1     No current facility-administered medications on file prior to visit.      Visit Vitals     /62     Pulse 64     Resp 16     Wt (!) 268 lb 3.2 oz (121.7 kg)     SpO2 94%  Comment: RA     BMI 38.76 kg/m2       Medical History  Active Ambulatory (Non-Hospital) Problems    Diagnosis     Coronary artery disease involving native coronary artery of native heart with angina pectoris     Essential hypertension     Dyslipidemia, goal LDL below 70     STUART (obstructive sleep apnea)     Obesity hypoventilation syndrome     Small airways disease     SANCHES (dyspnea on exertion)     Sepsis, due to unspecified organism     Type 2 diabetes mellitus with hyperglycemia     Hematuria, gross     Hematuria     Past Medical History   Diagnosis Date     Cardiomyopathy       Carpal tunnel syndrome      CHF (congestive heart failure)      Coronary artery disease      DM2 (diabetes mellitus, type 2)      Dyspnea on exertion 12/15/2016     Family history of myocardial infarction      GERD (gastroesophageal reflux disease)      High cholesterol      HLD (hyperlipidemia)      HTN (hypertension)      MI (myocardial infarction)      Sleep apnea, obstructive          Social History  Reviewed, and he hasn't started smoking again.  Still off duty for .     Allergies  Allergies   Allergen Reactions     Tetanus And Diphtheria Toxoids                               Data Review - imaging, labs, and ekgs listed below were reviewed by me.  Chest XRay and chest CT images and EKG tracings interpreted personally.     Past Labs  Admission on 12/15/2016, Discharged on 12/18/2016   No results displayed because visit has over 200 results.          Past Imaging  PSG raw data reviewed by me.  AHI >120 with obstructive, hypopnea, central apnea and mixed.  10/10/2 lpm with normalization of AHI at end of night based on my read. Tolerated it less well earlier in night.

## 2021-06-09 NOTE — ANESTHESIA POSTPROCEDURE EVALUATION
Patient: Kenrick Garner  Procedure(s):  COLONOSCOPY WITH BIOPSIES AND POLYPECTOMIES  Anesthesia type: No value filed.    Patient location: Phase II Recovery  Last vitals:   Vitals Value Taken Time   /60 6/18/2020 11:00 AM   Temp 36.5  C (97.7  F) 6/18/2020 10:24 AM   Pulse 53 6/18/2020 11:07 AM   Resp 14 6/18/2020 10:27 AM   SpO2 98 % 6/18/2020 11:07 AM   Vitals shown include unvalidated device data.  Post vital signs: stable  Level of consciousness: awake and responds to simple questions  Post-anesthesia pain: pain controlled  Post-anesthesia nausea and vomiting: no  Pulmonary: unassisted, return to baseline  Cardiovascular: stable and blood pressure at baseline  Hydration: adequate  Anesthetic events: no    QCDR Measures:  ASA# 11 - Alessia-op Cardiac Arrest: ASA11B - Patient did NOT experience unanticipated cardiac arrest  ASA# 12 - Alessia-op Mortality Rate: ASA12B - Patient did NOT die  ASA# 13 - PACU Re-Intubation Rate: ASA13B - Patient did NOT require a new airway mgmt  ASA# 10 - Composite Anes Safety: ASA10A - No serious adverse event    Additional Notes:

## 2021-06-09 NOTE — ANESTHESIA CARE TRANSFER NOTE
Last vitals:   Vitals:    06/18/20 1024   BP: 121/61   Pulse: 61   Resp: 16   Temp:    SpO2: 97%     Patient's level of consciousness is awake  Spontaneous respirations: yes  Maintains airway independently: yes  Dentition unchanged: yes  Oropharynx: oropharynx clear of all foreign objects    QCDR Measures:  ASA# 20 - Surgical No data recorded  PQRS# 430 - Adult PONV Prevention: NA - Not adult patient, not GA or 3 or more risk factors NOT present  ASA# 8 - Peds PONV Prevention: NA - Not pediatric patient, not GA or 2 or more risk factors NOT present  PQRS# 424 - Alessia-op Temp Management: NA - MAC anesthesia or case < 60 minutes  PQRS# 426 - PACU Transfer Protocol:NA - Patient did not go to PACU  ASA# 14 - Acute Post-op Pain: ASA14B - Patient did NOT experience pain >= 7 out of 10

## 2021-06-10 NOTE — PROGRESS NOTES
Assessment/Plan:        Diagnoses and all orders for this visit:    STUART (obstructive sleep apnea)  -     Oxygen    SANCHES (dyspnea on exertion)  -     Oxygen    Obesity hypoventilation syndrome    57M with OHS, STUART, dyspnea here for follow up.  OHS should improve with STUART treatment    Your sleep is much healthier with your sleep machine.    Wear your BiPAP every night.    Keep up with exercise 5-10 minutes every night after work.    Limit eating to stay awake.        Subjective:    Patient ID: Kenrick Garner is a 57 y.o. male.    HPI Comments: Wearing mask essentially every night.  Only times he hasn't is once or twice when sick.  Goes to bed at  6PM and often wears at least 6 hours.  He cannot feel any change.  His wife says that his sleep is much higher quality.    Dyspnea: slowly improving over months.  Worse with exertion.  Better with rest.  Localizes to the chest.      -- from previous  Here in follow up.    He did his sleep study and it was tough but he was able to sleep uninterrupted for 2+ hours at the end.  Diagnosed with severe STUART with central apneas.    He is feeling better from a breathing standpoint.  He is no longer wearing his oxygen.      --- progress  56M here for first visit after hospitalisation.  He has been having shortness of breath for several weeks. He was admitted with acute hypoxic respiratory failure recently. However he didn't have much by way of symptoms at that time.  He was diuresed.  He was sent home on oxygen.  His symptoms during the hospitalization weren't affected much.  His symptoms didn't localize.  + lower extremity edema.  No sputum production or hemoptysis.    He had an episode of pneumonia 2 years ago.  This doesn't feel similar to that at all.    30 years ago had a positive PSG for STUART and he did not tolerate CPAP.    He sometimes has sats during periods of inactivity but when up and moving or talking his sats are already normal.  He has no history of lung  disease.          Review of Systems  14 system review of system negative except as noted in HPI.        Objective:    Physical Exam   Constitutional: He is oriented to person, place, and time. He appears well-developed and well-nourished. No distress.   HENT:   Head: Normocephalic.   Nose: Nose normal.   Mouth/Throat: Oropharynx is clear and moist. No oropharyngeal exudate.   Eyes: Pupils are equal, round, and reactive to light. Right eye exhibits no discharge. Left eye exhibits no discharge. No scleral icterus.   Neck: Normal range of motion. No JVD present. No tracheal deviation present. No thyromegaly present.   Cardiovascular: Normal rate and regular rhythm.  Exam reveals no gallop and no friction rub.    No murmur heard.  Pulmonary/Chest: Effort normal and breath sounds normal. No stridor. No respiratory distress. He has no wheezes. He has no rales.   Abdominal: Soft. Bowel sounds are normal. He exhibits no distension. There is no tenderness.   Musculoskeletal: He exhibits no edema or tenderness.   Lymphadenopathy:     He has no cervical adenopathy.   Neurological: He is alert and oriented to person, place, and time. No cranial nerve deficit.   Skin: Skin is warm and dry. No rash noted. He is not diaphoretic. No erythema. No pallor.   Psychiatric: He has a normal mood and affect. His behavior is normal. Judgment and thought content normal.           Current Outpatient Prescriptions on File Prior to Visit   Medication Sig Dispense Refill     acetaminophen (TYLENOL) 500 MG tablet Take 1,000 mg by mouth every 6 (six) hours as needed for pain.       amLODIPine (NORVASC) 10 MG tablet Take 10 mg by mouth daily.       aspirin 81 MG EC tablet Take 81 mg by mouth daily.       atorvastatin (LIPITOR) 40 MG tablet Take 40 mg by mouth every evening.        carvedilol (COREG) 12.5 MG tablet Take 12.5 mg by mouth 2 (two) times a day with meals.       lisinopril (PRINIVIL,ZESTRIL) 40 MG tablet Take 40 mg by mouth daily.        metFORMIN (GLUCOPHAGE) 500 MG tablet Take 1,000 mg by mouth 2 (two) times a day with meals.       multivitamin with minerals (THERA-M) 9 mg iron-400 mcg Tab tablet Take 1 tablet by mouth daily.       nitroglycerin (NITROSTAT) 0.4 MG SL tablet Place 0.4 mg under the tongue every 5 (five) minutes as needed for chest pain.       omeprazole (PRILOSEC) 20 MG capsule Take 20 mg by mouth daily.       sitaGLIPtin (JANUVIA) 100 MG tablet Take 1 tablet (100 mg total) by mouth daily. 30 tablet 1     No current facility-administered medications on file prior to visit.      /64  Pulse 64  Resp 18  Wt (!) 260 lb (117.9 kg)  SpO2 96% Comment: RA  BMI 37.57 kg/m2    Medical History  Active Ambulatory (Non-Hospital) Problems    Diagnosis     Coronary artery disease involving native coronary artery of native heart with angina pectoris     Essential hypertension     Dyslipidemia, goal LDL below 70     STUART (obstructive sleep apnea)     Obesity hypoventilation syndrome     Small airways disease     SANCHES (dyspnea on exertion)     Sepsis, due to unspecified organism     Type 2 diabetes mellitus with hyperglycemia     Hematuria, gross     Hematuria     Past Medical History:   Diagnosis Date     Cardiomyopathy      Carpal tunnel syndrome      CHF (congestive heart failure)      Coronary artery disease      DM2 (diabetes mellitus, type 2)      Dyspnea on exertion 12/15/2016     Family history of myocardial infarction      GERD (gastroesophageal reflux disease)      High cholesterol      HLD (hyperlipidemia)      HTN (hypertension)      MI (myocardial infarction)      Sleep apnea, obstructive          Social History  Reviewed, and he hasn't started smoking again.  He has started driving again.     Allergies  Allergies   Allergen Reactions     Tetanus And Diphtheria Toxoids                               Data Review - imaging, labs, and ekgs listed below were reviewed by me.  Chest XRay and chest CT images and EKG tracings interpreted  personally.     Past Labs  No visits with results within 2 Month(s) from this visit.  Latest known visit with results is:    Admission on 01/24/2017, Discharged on 01/24/2017   Component Date Value     Color, UA 01/24/2017 Red*     Clarity, UA 01/24/2017 Turbid*     Glucose, UA 01/24/2017 Negative      Bilirubin, UA 01/24/2017       Ketones, UA 01/24/2017       Specific [place], UA 01/24/2017 1.015      Blood, UA 01/24/2017 Large*     pH, UA 01/24/2017 7.0      Protein, UA 01/24/2017       Urobilinogen, UA 01/24/2017       Nitrite, UA 01/24/2017       Leukocytes, UA 01/24/2017       Bacteria, UA 01/24/2017 None Seen      RBC, UA 01/24/2017 >100*     WBC, UA 01/24/2017 5-10*     Squam Epithel, UA 01/24/2017 0-5      Culture 01/24/2017 50,000-100,000 col/ml Escherichia coli*     Outside records reviewed from [facility]  AHI 4.4  Low leak  Used 90% of nights  4:45 average use

## 2021-06-12 NOTE — PROGRESS NOTES
Assessment/Plan:        Diagnoses and all orders for this visit:    STUART (obstructive sleep apnea)    SANCHES (dyspnea on exertion)    Chronic diastolic congestive heart failure  57M with OHS, STUART, dyspnea here for follow up.  He is being diligent with his BiPAP and wearing it every day.  He gets good benefit from his BiPAP.  He is fully compliant with the sleep apnea therapy.    Fluid Plan  Continue your lasix  Continue daily weights    Sleep Plan  Continue BiPAP every night    Weight Plan  Aim for even weight.  No weight gain    Exercise Plan  Stationary bike if one is available.  10-15 minutes per day.    Surgery Plan  You are at low risk for lung complications.  Bring your BiPAP machine with you to surgery and let your team know. Make sure your fluid level is good.        Subjective:    Patient ID: Kenirck Garner is a 57 y.o. male.    HPI Comments:  Obstructive sleep apnea: He is wearing his mask every night.  He wears it from greater than 4 hours every night.  It improves the quality of his sleep.  He is having a mask at problems.  He feels like it is made a significant improvement in his sleep.    Shortness of breath: This is persistent.  It is worse with exertion.  Is better with rest.  Symptoms localized to the chest.  It is stable since his previous visit.  Pertinent positives include obesity.  Pertinent negatives include no positional quality, no hemoptysis no wheezing.  He feels he is losing some of his exercise capacity and this is complicated by his musculoskeletal issues that limit his exercise.    Chronic congestive heart failure: This is doing well.  He takes his Lasix regularly.  He weighs himself regularly.      -- from previous  Wearing mask essentially every night.  Only times he hasn't is once or twice when sick.  Goes to bed at  6PM and often wears at least 6 hours.  He cannot feel any change.  His wife says that his sleep is much higher quality.    Dyspnea: slowly improving over months.  Worse with  exertion.  Better with rest.  Localizes to the chest.      -- from previous  Here in follow up.    He did his sleep study and it was tough but he was able to sleep uninterrupted for 2+ hours at the end.  Diagnosed with severe STUART with central apneas.    He is feeling better from a breathing standpoint.  He is no longer wearing his oxygen.      --- progress  56M here for first visit after hospitalisation.  He has been having shortness of breath for several weeks. He was admitted with acute hypoxic respiratory failure recently. However he didn't have much by way of symptoms at that time.  He was diuresed.  He was sent home on oxygen.  His symptoms during the hospitalization weren't affected much.  His symptoms didn't localize.  + lower extremity edema.  No sputum production or hemoptysis.    He had an episode of pneumonia 2 years ago.  This doesn't feel similar to that at all.    30 years ago had a positive PSG for STUART and he did not tolerate CPAP.    He sometimes has sats during periods of inactivity but when up and moving or talking his sats are already normal.  He has no history of lung disease.          Review of Systems  14 system review of system negative except as noted in HPI.        Objective:    Physical Exam   Constitutional: He is oriented to person, place, and time. He appears well-developed and well-nourished. No distress.   HENT:   Head: Normocephalic.   Nose: Nose normal.   Mouth/Throat: Oropharynx is clear and moist. No oropharyngeal exudate.   Eyes: Pupils are equal, round, and reactive to light. Right eye exhibits no discharge. Left eye exhibits no discharge. No scleral icterus.   Neck: Normal range of motion. No JVD present. No tracheal deviation present. No thyromegaly present.   Cardiovascular: Normal rate and regular rhythm.  Exam reveals no gallop and no friction rub.    No murmur heard.  Pulmonary/Chest: Effort normal and breath sounds normal. No stridor. No respiratory distress. He has no  wheezes. He has no rales.   Abdominal: Soft. Bowel sounds are normal. He exhibits no distension. There is no tenderness.   Musculoskeletal: He exhibits no edema or tenderness.   Lymphadenopathy:     He has no cervical adenopathy.   Neurological: He is alert and oriented to person, place, and time. No cranial nerve deficit.   Skin: Skin is warm and dry. No rash noted. He is not diaphoretic. No erythema. No pallor.   Psychiatric: He has a normal mood and affect. His behavior is normal. Judgment and thought content normal.           Current Outpatient Prescriptions on File Prior to Visit   Medication Sig Dispense Refill     acetaminophen (TYLENOL) 500 MG tablet Take 1,000 mg by mouth every 6 (six) hours as needed for pain.       amLODIPine (NORVASC) 10 MG tablet Take 10 mg by mouth daily.       aspirin 81 MG EC tablet Take 81 mg by mouth daily.       atorvastatin (LIPITOR) 40 MG tablet Take 40 mg by mouth every evening.        carvedilol (COREG) 12.5 MG tablet Take 12.5 mg by mouth 2 (two) times a day with meals.       lisinopril (PRINIVIL,ZESTRIL) 40 MG tablet Take 40 mg by mouth daily.       metFORMIN (GLUCOPHAGE) 500 MG tablet Take 1,000 mg by mouth 2 (two) times a day with meals.       multivitamin with minerals (THERA-M) 9 mg iron-400 mcg Tab tablet Take 1 tablet by mouth daily.       nitroglycerin (NITROSTAT) 0.4 MG SL tablet Place 0.4 mg under the tongue every 5 (five) minutes as needed for chest pain.       omeprazole (PRILOSEC) 20 MG capsule Take 20 mg by mouth daily.       sitaGLIPtin (JANUVIA) 100 MG tablet Take 1 tablet (100 mg total) by mouth daily. 30 tablet 1     No current facility-administered medications on file prior to visit.      /70  Pulse 60  Wt (!) 263 lb (119.3 kg)  SpO2 96% Comment: RA  BMI 38.01 kg/m2    Medical History  Active Ambulatory (Non-Hospital) Problems    Diagnosis     Coronary artery disease involving native coronary artery of native heart with angina pectoris      Essential hypertension     Dyslipidemia, goal LDL below 70     STUART (obstructive sleep apnea)     Obesity hypoventilation syndrome     Small airways disease     SANCHES (dyspnea on exertion)     Sepsis, due to unspecified organism     Type 2 diabetes mellitus with hyperglycemia     Hematuria, gross     Hematuria     Past Medical History:   Diagnosis Date     Cardiomyopathy      Carpal tunnel syndrome      CHF (congestive heart failure)      Coronary artery disease      DM2 (diabetes mellitus, type 2)      Dyspnea on exertion 12/15/2016     Family history of myocardial infarction      GERD (gastroesophageal reflux disease)      High cholesterol      HLD (hyperlipidemia)      HTN (hypertension)      MI (myocardial infarction)      Sleep apnea, obstructive          Social History  Reviewed, and he is still abstaining from smoking.  Still driving truck.   Allergies  Allergies   Allergen Reactions     Tetanus And Diphtheria Toxoids                               Data Review - imaging, labs, and ekgs listed below were reviewed by me.  Chest XRay and chest CT images and EKG tracings interpreted personally.     Past Labs  No visits with results within 2 Month(s) from this visit.  Latest known visit with results is:    Admission on 01/24/2017, Discharged on 01/24/2017   Component Date Value     Color, UA 01/24/2017 Red*     Clarity, UA 01/24/2017 Turbid*     Glucose, UA 01/24/2017 Negative      Bilirubin, UA 01/24/2017       Ketones, UA 01/24/2017       Specific [place], UA 01/24/2017 1.015      Blood, UA 01/24/2017 Large*     pH, UA 01/24/2017 7.0      Protein, UA 01/24/2017       Urobilinogen, UA 01/24/2017       Nitrite, UA 01/24/2017       Leukocytes, UA 01/24/2017       Bacteria, UA 01/24/2017 None Seen      RBC, UA 01/24/2017 >100*     WBC, UA 01/24/2017 5-10*     Squam Epithel, UA 01/24/2017 0-5      Culture 01/24/2017 50,000-100,000 col/ml Escherichia coli*     Outside records reviewed from [employer]  AHI 3.8  Low leak  Used  97% of nights  approx 5 hours average use

## 2021-06-16 PROBLEM — I27.20 PULMONARY HYPERTENSION (H): Status: ACTIVE | Noted: 2018-03-15

## 2021-06-16 NOTE — PROGRESS NOTES
Patient oxygen saturation on RA at rest is 92-93%.  Oxygen saturation after ambulating 150ft on RA is 87%.    After ambulating 250ft on 2LPM oxygen saturation is 88%.  After ambulating 300ft on 3LPM oxygen saturation is92%.    DME Provider: Yaa    Patient is ambulatory within his/her home.

## 2021-06-16 NOTE — PROGRESS NOTES
Patient called stating that [facility] cannot provide services for him due to his insurance. Patient request to change DME to Dan[facility].    DME Provider: Danial   Date Faxed: 3/9/18  Ordering Provider: Dr. Ayala  Equipment ordered: C-pap and Portable oxygen concentrator.

## 2021-06-16 NOTE — PROGRESS NOTES
Assessment/Plan:        Diagnoses and all orders for this visit:    STUART (obstructive sleep apnea)    Obesity hypoventilation syndrome    Pulmonary hypertension  58M with OHS, STUART, new diagnosis here for follow up.    Fortunately he has improved.  His pulmonary hypertension is WHO III due to OHS and STUART.  I am hopeful that with continued diuresis (not for LVEDP but to reduce 3rd spacing) and weight control he will improve.  I don't think we need to consider vasodilators at this time.    I agree with his cardiologist that this is not due to left heart disease.    Continue goal spO2>89%    Continue CPAP.    Continue diet and exercise.  Patient outlook has improved quite a bit.     Detailed discussion regarding prognosis, compliance with treatment.          Subjective:    Patient ID: Kenrick Garner is a 58 y.o. male.    HPI Comments:    58M here for follow up of new pulmonary hypertension dx.    He has lost 8 lbs.  New low salt diet with calorie restriction.  He had PA Cath yesterday and tolerated it fine.  Sx localize to the chest.  He is still taking his diuretics.  Overall he is feeling better.    He is still using his CPAP.    He is wearing his oxygen.  His sats are now normal at rest.  He would like to get a concentrator.  He has no symptoms when he desaturates      Review of Systems  Per HPI        Objective:    Physical Exam   Constitutional: He is oriented to person, place, and time. He appears well-developed and well-nourished. No distress.   HENT:   Head: Normocephalic.   Nose: Nose normal.   Eyes: Pupils are equal, round, and reactive to light. Right eye exhibits no discharge. Left eye exhibits no discharge. No scleral icterus.   Cardiovascular: Normal rate and regular rhythm.  Exam reveals no gallop and no friction rub.    No murmur heard.  Pulmonary/Chest: Effort normal and breath sounds normal. No respiratory distress. He has no wheezes. He has no rales.   Musculoskeletal: He exhibits no edema or  tenderness.   Neurological: He is alert and oriented to person, place, and time. No cranial nerve deficit.   Skin: Skin is warm and dry. No rash noted. He is not diaphoretic. No erythema. No pallor.   Psychiatric: He has a normal mood and affect. His behavior is normal. Judgment and thought content normal.           Current Outpatient Prescriptions on File Prior to Visit   Medication Sig Dispense Refill     acetaminophen (TYLENOL) 500 MG tablet Take 1,000 mg by mouth every 6 (six) hours as needed for pain.       amLODIPine (NORVASC) 10 MG tablet Take 10 mg by mouth daily.       aspirin 81 MG EC tablet Take 81 mg by mouth daily.       atorvastatin (LIPITOR) 40 MG tablet Take 40 mg by mouth every evening.        carvedilol (COREG) 12.5 MG tablet Take 12.5 mg by mouth 2 (two) times a day with meals.       furosemide (LASIX) 40 MG tablet Take 40 mg by mouth.       lisinopril (PRINIVIL,ZESTRIL) 40 MG tablet Take 40 mg by mouth daily.       metFORMIN (GLUCOPHAGE) 500 MG tablet Take 1,000 mg by mouth 2 (two) times a day with meals.       multivitamin with minerals (THERA-M) 9 mg iron-400 mcg Tab tablet Take 1 tablet by mouth daily.       nitroglycerin (NITROSTAT) 0.4 MG SL tablet Place 0.4 mg under the tongue every 5 (five) minutes as needed for chest pain.       omeprazole (PRILOSEC) 20 MG capsule Take 20 mg by mouth daily.       potassium chloride SA (K-DUR,KLOR-CON) 20 MEQ tablet Take 20 mEq by mouth.       sitaGLIPtin (JANUVIA) 100 MG tablet Take 1 tablet (100 mg total) by mouth daily. 30 tablet 1     No current facility-administered medications on file prior to visit.      /62  Pulse 60  Resp 18  Wt (!) 264 lb (119.7 kg)  SpO2 96% Comment: RA  BMI 38.15 kg/m2    Medical History  Active Ambulatory (Non-Hospital) Problems    Diagnosis     Coronary artery disease involving native coronary artery of native heart with angina pectoris     Essential hypertension     Dyslipidemia, goal LDL below 70     STUART  (obstructive sleep apnea)     Obesity hypoventilation syndrome     Small airways disease     SANCHES (dyspnea on exertion)     Sepsis, due to unspecified organism     Type 2 diabetes mellitus with hyperglycemia     Hematuria, gross     Hematuria     Past Medical History:   Diagnosis Date     Cardiomyopathy      Carpal tunnel syndrome      CHF (congestive heart failure)      Coronary artery disease      DM2 (diabetes mellitus, type 2)      Dyspnea on exertion 12/15/2016     Family history of myocardial infarction      GERD (gastroesophageal reflux disease)      High cholesterol      HLD (hyperlipidemia)      HTN (hypertension)      MI (myocardial infarction)      Sleep apnea, obstructive          Social History  Reviewed, and he is still abstaining from smoking.  Still driving truck.  Minimal exertion at work.   Allergies  Allergies   Allergen Reactions     Tetanus And Diphtheria Toxoids                               Data Review - imaging, labs, and ekgs listed below were reviewed by me.  Chest XRay and chest CT images and EKG tracings interpreted personally.     Past Labs  No visits with results within 2 Month(s) from this visit.  Latest known visit with results is:    Admission on 01/24/2017, Discharged on 01/24/2017   Component Date Value     Color, UA 01/24/2017 Red*     Clarity, UA 01/24/2017 Turbid*     Glucose, UA 01/24/2017 Negative      Bilirubin, UA 01/24/2017       Ketones, UA 01/24/2017       Specific [place], UA 01/24/2017 1.015      Blood, UA 01/24/2017 Large*     pH, UA 01/24/2017 7.0      Protein, UA 01/24/2017       Urobilinogen, UA 01/24/2017       Nitrite, UA 01/24/2017       Leukocytes, UA 01/24/2017       Bacteria, UA 01/24/2017 None Seen      RBC, UA 01/24/2017 >100*     WBC, UA 01/24/2017 5-10*     Squam Epithel, UA 01/24/2017 0-5      Culture 01/24/2017 50,000-100,000 col/ml Escherichia coli*     Outside records from [facility] requested by me reviewed by me.  Summarized below.    RHC with mean PA  pressure 35, normal LV pressures, no coronary disease.    >25 minutes >50% counseling and coordination of care.

## 2021-06-16 NOTE — PROGRESS NOTES
DME Provider: Yaa  Date Faxed: 2/28/18  Ordering Provider: Dr. Ayala  Equipment ordered: Portable oxygen conentrator

## 2021-06-16 NOTE — PROGRESS NOTES
Assessment/Plan:        Diagnoses and all orders for this visit:    Dyspnea on exertion  -     Check Pulse Oximetry while ambulating    Obesity hypoventilation syndrome  -     Oxygen via concentrator    Chronic respiratory failure with hypoxia  -     Oxygen via concentrator    Obstructive sleep apnea    57M with OHS, STUART, dyspnea here for follow up.  He is doing worse.  Is evidence of hypoventilation syndrome and can normalize his oxygen saturations at rest with few deep breaths on room air.  Unfortunately he also has significant exertional hypoxia.  This is not characteristic of obesity hypoventilation syndrome and in the setting of his mild reduction in right ventricle function on her previous echocardiogram I am concerned for worsening pulmonary hypertension.    Fluid Plan  Continue your diuretics  Continue daily weights    Sleep Plan  Continue BiPAP every night    Weight Plan  Aim for even weight.  No weight gain    Exercise Plan  Stationary bike if one is available.  10-15 minutes per day.    Due to desaturation of SaO2 less than or equal to 88% on Room air from (diagnosis) obesity hypoventilation syndrome, home oxygen therapy will benefit my patient's condition.  The patient has tried (or considered) medications with limited success and oxygen is still required.  This patient is mobile in the home and requires portability.    Patient needs portable oxygen with ambulation at 3LPM/NC.  Patient also needs POC.  OK for conserving device.  OK to do titration study if needed--keep oxygen saturation 90%.  Please issue conserving device with appropriate titrated setting after patient completes successful assessment.          Subjective:    Patient ID: Kenrick Garner is a 58 y.o. male.    HPI Comments:    58-year-old man here for follow-up of sleep apnea and worsening shortness of breath and worsening hypoxia with exertion and at rest.    Shortness breath: This is worsened significantly.  This is taken place over the  last few months.  Is worse with exertion.  Improves with rest.  His oxygen numbers up into the 80s and 70s.  Symptoms localized to the chest.  Pertinent positives include obstructive sleep apnea, lower extremity edema which at this point is resolved with diuretics.  Pertinent negatives include no fever or hemoptysis.    He has a diagnosis of diastolic heart failure.  He follows with cardiology at [place] and they are aggressively managing his fluid status.  He tends not put fluid into his legs or abdomen but just generally gains fluid weight.  He does feel better when he diuresis.    He wears his CPAP every night.  His obstructive sleep apnea is well managed by his CPAP.        Review of Systems  14 system review of system positive for dental problem apnea shortness of breath nausea headaches joint pain.  The remainder of 14 system review systems is negative.        Objective:    Physical Exam   Constitutional: He is oriented to person, place, and time. He appears well-developed and well-nourished. No distress.   HENT:   Head: Normocephalic.   Nose: Nose normal.   Eyes: Pupils are equal, round, and reactive to light. Right eye exhibits no discharge. Left eye exhibits no discharge. No scleral icterus.   Neck: Normal range of motion. No JVD present. No tracheal deviation present. No thyromegaly present.   Cardiovascular: Normal rate and regular rhythm.  Exam reveals no gallop and no friction rub.    No murmur heard.  Pulmonary/Chest: Effort normal and breath sounds normal. No stridor. No respiratory distress. He has no wheezes. He has no rales.   Abdominal: Soft. Bowel sounds are normal. He exhibits no distension. There is no tenderness.   Musculoskeletal: He exhibits no edema or tenderness.   Lymphadenopathy:     He has no cervical adenopathy.   Neurological: He is alert and oriented to person, place, and time. No cranial nerve deficit.   Skin: Skin is warm and dry. No rash noted. He is not diaphoretic. No erythema.  No pallor.   Psychiatric: He has a normal mood and affect. His behavior is normal. Judgment and thought content normal.           Current Outpatient Prescriptions on File Prior to Visit   Medication Sig Dispense Refill     acetaminophen (TYLENOL) 500 MG tablet Take 1,000 mg by mouth every 6 (six) hours as needed for pain.       amLODIPine (NORVASC) 10 MG tablet Take 10 mg by mouth daily.       aspirin 81 MG EC tablet Take 81 mg by mouth daily.       atorvastatin (LIPITOR) 40 MG tablet Take 40 mg by mouth every evening.        carvedilol (COREG) 12.5 MG tablet Take 12.5 mg by mouth 2 (two) times a day with meals.       furosemide (LASIX) 40 MG tablet Take 40 mg by mouth.       lisinopril (PRINIVIL,ZESTRIL) 40 MG tablet Take 40 mg by mouth daily.       metFORMIN (GLUCOPHAGE) 500 MG tablet Take 1,000 mg by mouth 2 (two) times a day with meals.       multivitamin with minerals (THERA-M) 9 mg iron-400 mcg Tab tablet Take 1 tablet by mouth daily.       nitroglycerin (NITROSTAT) 0.4 MG SL tablet Place 0.4 mg under the tongue every 5 (five) minutes as needed for chest pain.       omeprazole (PRILOSEC) 20 MG capsule Take 20 mg by mouth daily.       potassium chloride SA (K-DUR,KLOR-CON) 20 MEQ tablet Take 20 mEq by mouth.       sitaGLIPtin (JANUVIA) 100 MG tablet Take 1 tablet (100 mg total) by mouth daily. 30 tablet 1     No current facility-administered medications on file prior to visit.      /60  Pulse 70  Resp 19  Wt (!) 264 lb (119.7 kg)  SpO2 90% Comment: 2LPM  BMI 38.15 kg/m2    Medical History  Active Ambulatory (Non-Hospital) Problems    Diagnosis     Coronary artery disease involving native coronary artery of native heart with angina pectoris     Essential hypertension     Dyslipidemia, goal LDL below 70     STUART (obstructive sleep apnea)     Obesity hypoventilation syndrome     Small airways disease     SANCHES (dyspnea on exertion)     Sepsis, due to unspecified organism     Type 2 diabetes mellitus with  hyperglycemia     Hematuria, gross     Hematuria     Past Medical History:   Diagnosis Date     Cardiomyopathy      Carpal tunnel syndrome      CHF (congestive heart failure)      Coronary artery disease      DM2 (diabetes mellitus, type 2)      Dyspnea on exertion 12/15/2016     Family history of myocardial infarction      GERD (gastroesophageal reflux disease)      High cholesterol      HLD (hyperlipidemia)      HTN (hypertension)      MI (myocardial infarction)      Sleep apnea, obstructive          Social History  Reviewed, and he is still abstaining from smoking.  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  Chest XRay and chest CT images and EKG tracings interpreted personally.     Past Labs  No visits with results within 2 Month(s) from this visit.  Latest known visit with results is:    Admission on 01/24/2017, Discharged on 01/24/2017   Component Date Value     Color, UA 01/24/2017 Red*     Clarity, UA 01/24/2017 Turbid*     Glucose, UA 01/24/2017 Negative      Bilirubin, UA 01/24/2017       Ketones, UA 01/24/2017       Specific [place], UA 01/24/2017 1.015      Blood, UA 01/24/2017 Large*     pH, UA 01/24/2017 7.0      Protein, UA 01/24/2017       Urobilinogen, UA 01/24/2017       Nitrite, UA 01/24/2017       Leukocytes, UA 01/24/2017       Bacteria, UA 01/24/2017 None Seen      RBC, UA 01/24/2017 >100*     WBC, UA 01/24/2017 5-10*     Squam Epithel, UA 01/24/2017 0-5      Culture 01/24/2017 50,000-100,000 col/ml Escherichia coli*     Outside records from [facility] requested by me reviewed by me.  Summarized below.    Echocardiogram showing:function.   Right Ventricle Mild right ventricular enlargement. Mildly reduced right ventricular global   systolic function. Right ventricular   systolic pressure cannot be estimated due to inability to  detect peak tricuspid regurgitation     Reviewed cardiology note: Kenrick was diagnosed with heart failure with preserved EF, his last visit they felt he was euvolemic on exam.  He had close monitoring of fluid weight and diuretic plan available for the patient per

## 2021-06-19 NOTE — LETTER
Letter by Terry Vieira MD at      Author: Terry Vieira MD Service: -- Author Type: --    Filed:  Encounter Date: 6/10/2019 Status: (Other)         Rosana Nunn MD  [address] [facility] [place] [address]                                  Юлия 10, 2019    Patient: Kenrick Garner   MR Number: [id number]   YOB: 1960   Date of Visit: 6/10/2019     Dear Dr. Arilne MD:    Thank you for referring Kenrick Garner to me for evaluation. Below are the relevant portions of my assessment and plan of care.    If you have questions, please do not hesitate to call me. I look forward to following Kenrick along with you.    Sincerely,        Terry Vieira MD          CC  No Recipients  Terry Vieira MD  6/10/2019 11:27 AM  Sign at close encounter  Assessment:Kenrick Garner is a 59 y.o. with a past medical history significant for pulmonary hypertension thought to be WHO 3 due to OHS and STUART, last seen by Dr. Ayala in our clinic in March 2018.  At that time his symptoms were stable and his O2 saturations were normal on room air.  He has been using 2 L/min oxygen bleed and at night with his home BiPAP machine which he states he uses every night since obtaining it 3 years ago.  He presents to clinic today for worsening shortness of breath and hypoxemia noted to be in the low 80s at home for the past 4 to 6 weeks.  He denies fevers or chills, cough, recent sick contacts, or other history concerning for acute infection.  He denies any recent weight gain and she is actually lost about 20 pounds since obtaining his BiPAP machine 3 years ago.  He has followed by a cardiologist at [facility] who have determined his cardiac issues are not the cause of his shortness of breath or hypoxemia, however they are concerned for worsening of his pulmonary hypertension.  Patient continues to smoke about 2 cigars/week and is exposed to feel exhausted in his job as a .    At clinic  visit today, the patient was noted to be hypoxic in the low 80s on room air.  He required 1 L/min of oxygen at rest, and 4 L/min with ambulation to maintain O2 sats in the normal range.    4/5/2019 echocardiogram at [place]: Normal LVEF, no change noted from 7/2017 echocardiogram, right ventricular systolic function cannot be adequately assessed    Worsening in his shortness of breath and new hypoxemia during the daytime could be due to worsening of his pulmonary hypertension or another etiology such as PE, interstitial lung disease, or other pathology.    Plan:  New sleep study and sleep clinic follow-up, the patient states that it has been 3 years since his BiPAP has been checked    CXR ordered for and patient instructed to obtain it today    If chest CT negative, obtain chest CT angiogram to look for pulmonary emboli and/or fibrosis    Pulmonary Function Tests    Blood tests today to check renal function and for additional causes of pulmonary hypertension    Use home oxygen during daytime to maintain sats>92%: 1 L/min at rest and 4 L/min with exertion.  I emphasized to the patient and his wife to use home oxygen at all times, particularly when driving as hypoxemia could put him at risk of becoming unconscious placing him at risk to himself and others.  Both he and his wife verbalized their understanding.  Orders for portable oxygen to use at rest and with exertion during the daytime have been sent to his DME provider at [facility].    Patient advised to use mask when around gas fumes    Avoid/refrain from all types of smoke (including cigars)    Return to pulmonary clinic in 2 weeks, will attempt to schedule with 1 of our pulmonary hypertension focused providers    CCx: Shortness of breath    HPI: The patient states that he has been having severe shortness of breath for little over a month.  Shortness of breath primarily occurs with exertion, but occasionally at rest.  He has not noted any modifying factors.  He has  been seen by cardiologist with a negative work-up.  He denies any recent cough, fevers or chills, nausea vomiting, or other infectious symptoms.  He continues to use his BiPAP every night with 2 L of oxygen bleed done, however does not use oxygen during the daytime.  He has noted his home O2 sats to be in the low 80s on multiple occasions at home.  He continues to smoke a couple of cigars per week.  He states that he is also exposed to frequent gas fumes at his job as a  which he cannot avoid, however he believes he could get a mask to use around the fumes.    ROS:  A review of 12 organ systems was performed with pertinent positives and negatives noted in the HPI.      Current Meds:  Current Outpatient Medications   Medication Sig Note   ? acetaminophen (TYLENOL) 500 MG tablet Take 1,000 mg by mouth every 6 (six) hours as needed for pain.    ? amLODIPine (NORVASC) 10 MG tablet Take 10 mg by mouth daily.    ? aspirin 81 MG EC tablet Take 81 mg by mouth daily.    ? atorvastatin (LIPITOR) 40 MG tablet Take 40 mg by mouth every evening.     ? carvedilol (COREG) 12.5 MG tablet Take 12.5 mg by mouth 2 (two) times a day with meals.    ? furosemide (LASIX) 40 MG tablet Take 40 mg by mouth. 8/10/2017: Received from: [facility] Received Sig: Take 1 tablet by mouth every morning.   ? glipiZIDE (GLUCOTROL XL) 10 MG 24 hr tablet Take 10 mg by mouth 2 (two) times a day.    ? lisinopril (PRINIVIL,ZESTRIL) 40 MG tablet Take 40 mg by mouth daily.    ? metFORMIN (GLUCOPHAGE) 500 MG tablet Take 1,000 mg by mouth 2 (two) times a day with meals.    ? multivitamin with minerals (THERA-M) 9 mg iron-400 mcg Tab tablet Take 1 tablet by mouth daily.    ? nitroglycerin (NITROSTAT) 0.4 MG SL tablet Place 0.4 mg under the tongue every 5 (five) minutes as needed for chest pain.    ? omeprazole (PRILOSEC) 20 MG capsule Take 20 mg by mouth daily.    ? OXYGEN-AIR DELIVERY SYSTEMS [facility] Use As  Directed. 2L with BiPap at night  [facility]    ? potassium chloride SA (K-DUR,KLOR-CON) 20 MEQ tablet Take 20 mEq by mouth. 8/10/2017: Received from: [facility] Received Sig: Take 1 tablet by mouth once daily with a meal.       Labs:  No results found for this or any previous visit (from the past 72 hour(s)).    I have personally reviewed all pertinent imaging studies and PFT results unless otherwise noted.    Imaging studies:  No results found.      Physical Exam:  /66   Pulse 64   Resp 20   Wt (!) 250 lb 14.4 oz (113.8 kg)   SpO2 94% Comment: 3 LPM  BMI 36.26 kg/m     General - Well nourished  Ears/Mouth -  OP pink moist, no thrush  Neck - no cervical lymphadenopathy  Lungs - Clear to ausculation bilaterally   CVS - regular rhythm with no murmurs, rubs or gallups  Abdomen - soft, NT, ND, NABS  Ext - no cyanosis, clubbing or edema  Skin - no rash  Psychology - alert and oriented, answers appropriate        Electronically signed by:    Puma Vieira MD  [facility] Pulmonary and Critical Care Medicine

## 2021-07-03 NOTE — ADDENDUM NOTE
Addendum Note by Graeme Ayala MD at 1/13/2017  9:10 AM     Author: Graeme Ayala MD Service: -- Author Type: Physician    Filed: 1/13/2017  9:10 AM Encounter Date: 1/11/2017 Status: Signed    : Graeme Ayala MD (Physician)    Addended by: GRAEME AYALA on: 1/13/2017 09:10 AM        Modules accepted: Orders

## 2022-05-03 ENCOUNTER — LAB REQUISITION (OUTPATIENT)
Dept: LAB | Facility: CLINIC | Age: 62
End: 2022-05-03

## 2022-05-03 LAB — PTH-INTACT SERPL-MCNC: 246 PG/ML (ref 10–86)

## 2022-05-03 PROCEDURE — 82306 VITAMIN D 25 HYDROXY: CPT | Performed by: INTERNAL MEDICINE

## 2022-05-03 PROCEDURE — 83970 ASSAY OF PARATHORMONE: CPT | Performed by: INTERNAL MEDICINE

## 2022-05-04 LAB — DEPRECATED CALCIDIOL+CALCIFEROL SERPL-MC: 38 UG/L (ref 20–75)

## 2022-08-03 ENCOUNTER — LAB REQUISITION (OUTPATIENT)
Dept: LAB | Facility: CLINIC | Age: 62
End: 2022-08-03

## 2022-08-03 LAB — PTH-INTACT SERPL-MCNC: 41 PG/ML (ref 15–65)

## 2022-08-03 PROCEDURE — 83970 ASSAY OF PARATHORMONE: CPT | Performed by: INTERNAL MEDICINE

## 2023-07-25 ENCOUNTER — LAB REQUISITION (OUTPATIENT)
Dept: LAB | Facility: CLINIC | Age: 63
End: 2023-07-25

## 2023-07-25 PROCEDURE — 83970 ASSAY OF PARATHORMONE: CPT | Performed by: INTERNAL MEDICINE

## 2023-07-25 PROCEDURE — 82306 VITAMIN D 25 HYDROXY: CPT | Performed by: INTERNAL MEDICINE

## 2023-07-26 LAB
DEPRECATED CALCIDIOL+CALCIFEROL SERPL-MC: 51 UG/L (ref 20–75)
PTH-INTACT SERPL-MCNC: 37 PG/ML (ref 15–65)

## 2024-02-07 ENCOUNTER — LAB REQUISITION (OUTPATIENT)
Dept: LAB | Facility: CLINIC | Age: 64
End: 2024-02-07

## 2024-02-07 PROCEDURE — 83970 ASSAY OF PARATHORMONE: CPT | Performed by: INTERNAL MEDICINE

## 2024-02-07 PROCEDURE — 82306 VITAMIN D 25 HYDROXY: CPT | Performed by: INTERNAL MEDICINE

## 2024-02-08 LAB
PTH-INTACT SERPL-MCNC: 57 PG/ML (ref 15–65)
VIT D+METAB SERPL-MCNC: 42 NG/ML (ref 20–50)